# Patient Record
Sex: MALE | Race: WHITE
[De-identification: names, ages, dates, MRNs, and addresses within clinical notes are randomized per-mention and may not be internally consistent; named-entity substitution may affect disease eponyms.]

---

## 2018-12-28 ENCOUNTER — HOSPITAL ENCOUNTER (EMERGENCY)
Dept: HOSPITAL 53 - M ED | Age: 28
Discharge: HOME | End: 2018-12-28
Payer: COMMERCIAL

## 2018-12-28 VITALS — SYSTOLIC BLOOD PRESSURE: 128 MMHG | DIASTOLIC BLOOD PRESSURE: 84 MMHG

## 2018-12-28 VITALS — WEIGHT: 185.39 LBS | HEIGHT: 72 IN | BODY MASS INDEX: 25.11 KG/M2
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
## 2018-12-28 DIAGNOSIS — Z77.098: ICD-10-CM
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
## 2018-12-28 DIAGNOSIS — S93.402A: Primary | ICD-10-CM
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
## 2018-12-28 NOTE — REP
Clinical:  Trauma.

 

Technique:  AP, lateral, bilateral oblique views of the left ankle.

 

Findings:

Lateral swelling consist with inversion injury.  No acute fracture or

dislocation.  Ankle mortise intact.

 

Impression:

No acute fracture or dislocation.

 

 

Electronically Signed by

Carlos Eckert MD 12/28/2018 06:47 P

## 2018-12-28 NOTE — REP
Nickel:  Trauma.

 

Technique:  AP and lateral views of the left tibia / fibula.

 

Findings:

No acute fracture dislocation. Lateral ankle swelling. Skeletal structures, joint

spaces, and surrounding soft tissues are normal.  No subcutaneous emphysema or

radiodense foreign body.

 

Impression:

No acute fracture dislocation.  Lateral ankle swelling.

 

 

Electronically Signed by

Carlos Eckert MD 12/28/2018 06:48 P